# Patient Record
Sex: FEMALE | Race: WHITE | NOT HISPANIC OR LATINO | Employment: UNEMPLOYED | URBAN - METROPOLITAN AREA
[De-identification: names, ages, dates, MRNs, and addresses within clinical notes are randomized per-mention and may not be internally consistent; named-entity substitution may affect disease eponyms.]

---

## 2017-03-14 ENCOUNTER — HOSPITAL ENCOUNTER (OUTPATIENT)
Dept: RADIOLOGY | Facility: CLINIC | Age: 42
Discharge: HOME | End: 2017-03-14
Attending: INTERNAL MEDICINE
Payer: MEDICAID

## 2017-03-14 ENCOUNTER — TELEPHONE (OUTPATIENT)
Dept: ENDOCRINOLOGY | Facility: CLINIC | Age: 42
End: 2017-03-14

## 2017-03-14 ENCOUNTER — PATIENT MESSAGE (OUTPATIENT)
Dept: ENDOCRINOLOGY | Facility: CLINIC | Age: 42
End: 2017-03-14

## 2017-03-14 DIAGNOSIS — E04.1 THYROID NODULE: Primary | ICD-10-CM

## 2017-03-14 DIAGNOSIS — C73 THYROID CANCER: ICD-10-CM

## 2017-03-14 PROCEDURE — 76536 US EXAM OF HEAD AND NECK: CPT | Mod: 26,,, | Performed by: RADIOLOGY

## 2017-03-14 PROCEDURE — 76536 US EXAM OF HEAD AND NECK: CPT | Mod: TC,PO

## 2017-03-14 NOTE — TELEPHONE ENCOUNTER
The following lab tests are abnormal:  Please see the thyroid ultrasound report.  Because of previous FNA done elsewhere and [facility] report suggesting suspicious for thyroid cancer  1 Still recommend surgery  2 If not then repeat FNA here and also get genetic studies.  I reviewed case with Dr. Johnson  I am retiring and I am going to have you scheduled to see Dr. Johnson for additional opinion and f/u. In one month  Suggest we get FNA of small right thyroid nodule as well and will schedule

## 2017-03-14 NOTE — TELEPHONE ENCOUNTER
Called and notified patient of results and suggestions and she says will f/u  Suggesting seeing Dr. Johnson since soon retiring

## 2017-03-14 NOTE — TELEPHONE ENCOUNTER
"Called pt to let her know I scheduled her US at [facility] for today 3/14 as requested. It is for 330pm. I could not leave a message as her phone is "not set up to receive messages" per answering system. DH  "

## 2017-03-15 ENCOUNTER — PATIENT MESSAGE (OUTPATIENT)
Dept: ENDOCRINOLOGY | Facility: CLINIC | Age: 42
End: 2017-03-15

## 2019-08-14 ENCOUNTER — TELEPHONE (OUTPATIENT)
Dept: HEMATOLOGY/ONCOLOGY | Facility: CLINIC | Age: 44
End: 2019-08-14

## 2022-06-19 ENCOUNTER — NURSE TRIAGE (OUTPATIENT)
Dept: ADMINISTRATIVE | Facility: CLINIC | Age: 47
End: 2022-06-19
Payer: MEDICAID

## 2022-06-19 ENCOUNTER — HOSPITAL ENCOUNTER (EMERGENCY)
Facility: HOSPITAL | Age: 47
Discharge: HOME | End: 2022-06-19
Attending: EMERGENCY MEDICINE
Payer: MEDICAID

## 2022-06-19 VITALS
WEIGHT: 250 LBS | SYSTOLIC BLOOD PRESSURE: 140 MMHG | TEMPERATURE: 99 F | RESPIRATION RATE: 20 BRPM | HEIGHT: 64 IN | HEART RATE: 103 BPM | DIASTOLIC BLOOD PRESSURE: 87 MMHG | OXYGEN SATURATION: 97 % | BODY MASS INDEX: 42.68 KG/M2

## 2022-06-19 DIAGNOSIS — R00.0 TACHYCARDIA: ICD-10-CM

## 2022-06-19 DIAGNOSIS — Z20.822 EXPOSURE TO COVID-19 VIRUS: ICD-10-CM

## 2022-06-19 DIAGNOSIS — U07.1 COVID-19: Primary | ICD-10-CM

## 2022-06-19 LAB — SARS-COV-2 RDRP RESP QL NAA+PROBE: POSITIVE

## 2022-06-19 PROCEDURE — 93010 ELECTROCARDIOGRAM REPORT: CPT | Mod: ,,, | Performed by: INTERNAL MEDICINE

## 2022-06-19 PROCEDURE — 93010 EKG 12-LEAD: ICD-10-PCS | Mod: ,,, | Performed by: INTERNAL MEDICINE

## 2022-06-19 PROCEDURE — 99284 EMERGENCY DEPT VISIT MOD MDM: CPT

## 2022-06-19 PROCEDURE — 93005 ELECTROCARDIOGRAM TRACING: CPT

## 2022-06-19 PROCEDURE — U0002 COVID-19 LAB TEST NON-CDC: HCPCS | Performed by: NURSE PRACTITIONER

## 2022-06-19 RX ORDER — IBUPROFEN 100 MG/5ML
1000 SUSPENSION, ORAL (FINAL DOSE FORM) ORAL 2 TIMES DAILY
Qty: 20 TABLET | Refills: 0 | Status: SHIPPED | OUTPATIENT
Start: 2022-06-19 | End: 2022-06-19 | Stop reason: SDUPTHER

## 2022-06-19 RX ORDER — IBUPROFEN 100 MG/5ML
1000 SUSPENSION, ORAL (FINAL DOSE FORM) ORAL 2 TIMES DAILY
Qty: 20 TABLET | Refills: 0 | Status: SHIPPED | OUTPATIENT
Start: 2022-06-19 | End: 2022-06-29

## 2022-06-19 RX ORDER — PROPRANOLOL HYDROCHLORIDE 10 MG/1
10 TABLET ORAL 3 TIMES DAILY
Qty: 30 TABLET | Refills: 0 | Status: SHIPPED | OUTPATIENT
Start: 2022-06-19 | End: 2022-07-11

## 2022-06-19 RX ORDER — PROPRANOLOL HYDROCHLORIDE 10 MG/1
10 TABLET ORAL 3 TIMES DAILY
Qty: 30 TABLET | Refills: 0 | Status: SHIPPED | OUTPATIENT
Start: 2022-06-19 | End: 2022-06-19 | Stop reason: SDUPTHER

## 2022-06-19 NOTE — ED TRIAGE NOTES
Pt states that she began having a headache and feeling tired with body aches that started today pt states that she has a sore throat also pt states that her boyfriend tested positive for covid today so pt states that she believes she has covid

## 2022-06-19 NOTE — TELEPHONE ENCOUNTER
Two family members tested positive today, pt also developed sore throat, fatigue, chills, bad HA, and nausea.     PMH of T2DM and unvaccinated against COVID. Denies CP or SOB. Does not have PCP. Advised ED/UCC or OAC. VU     Reason for Disposition   HIGH RISK for severe COVID complications (e.g., weak immune system, age > 64 years, obesity with BMI > 25, pregnant, chronic lung disease or other chronic medical condition)  (Exception: Already seen by PCP and no new or worsening symptoms.)    Additional Information   Negative: SEVERE difficulty breathing (e.g., struggling for each breath, speaks in single words)   Negative: Difficult to awaken or acting confused (e.g., disoriented, slurred speech)   Negative: Bluish (or gray) lips or face now   Negative: Shock suspected (e.g., cold/pale/clammy skin, too weak to stand, low BP, rapid pulse)   Negative: Sounds like a life-threatening emergency to the triager   Negative: SEVERE or constant chest pain or pressure  (Exception: Mild central chest pain, present only when coughing.)   Negative: MODERATE difficulty breathing (e.g., speaks in phrases, SOB even at rest, pulse 100-120)   Negative: [1] Headache AND [2] stiff neck (can't touch chin to chest)   Negative: Oxygen level (e.g., pulse oximetry) 90 percent or lower   Negative: Chest pain or pressure   Negative: Patient sounds very sick or weak to the triager   Negative: MILD difficulty breathing (e.g., minimal/no SOB at rest, SOB with walking, pulse <100)   Negative: Fever > 103 F (39.4 C)   Negative: [1] Fever > 101 F (38.3 C) AND [2] age > 60 years   Negative: [1] Fever > 100.0 F (37.8 C) AND [2] bedridden (e.g., nursing home patient, CVA, chronic illness, recovering from surgery)    Protocols used: CORONAVIRUS (COVID-19) DIAGNOSED OR [id number]

## 2022-06-20 ENCOUNTER — TELEPHONE (OUTPATIENT)
Dept: CARDIOLOGY | Facility: CLINIC | Age: 47
End: 2022-06-20
Payer: MEDICAID

## 2022-06-20 NOTE — TELEPHONE ENCOUNTER
----- Message from Nicci Catherine sent at 6/20/2022  7:58 AM CDT -----  Contact: pt  Type: Needs Medical Advice    Who: Called: Pt   Best Call Back Number: [phone]  Inquiry/Question: Pt used to be a pt of Dr hdz for heart issues and diabetes and she was diagnosed with covid she states they gave her Paxlovid and 1000 units of Vitamin C in morning and evening D3 and zinc and she is asking if she can take this all with her heart and diabetes , she states if she needs to do a virtual she can please call to advise    Thank you~        Spoke with patient advised that not enough information to advise rather to take it or not but the vitamins are fine . Advised patient stay hydrated and if you decide to take medication just monitor and if any side effects happened discontinue medication and contact provider that prescribed medication for covid

## 2022-06-20 NOTE — TELEPHONE ENCOUNTER
----- Message from Nicci Catherine sent at 6/20/2022  7:58 AM CDT -----  Contact: pt  Type: Needs Medical Advice    Who: Called: Pt   Best Call Back Number: [phone]  Inquiry/Question: Pt used to be a pt of Dr hdz for heart issues and diabetes and she was diagnosed with covid she states they gave her Paxlovid and 1000 units of Vitamin C in morning and evening D3 and zinc and she is asking if she can take this all with her heart and diabetes , she states if she needs to do a virtual she can please call to advise    Thank you~

## 2022-06-20 NOTE — ED PROVIDER NOTES
Encounter Date: 2022       History     Chief Complaint   Patient presents with    COVID-19 Concerns     46 years old patient presented to ER with a complaint of exposure to covid 19 ( tested positive today), mild symptoms of weakness, body ache, history of DM, tachycardia, does not take any mediation for pt's chronic condition    The history is provided by the patient.   URI  The primary symptoms include fatigue, headaches and arthralgias. Primary symptoms do not include fever. The current episode started today. This is a new problem.   The fatigue began today.   The headache began today. The pain from the headache is at a severity of 5/10. The headache is associated with activity. The headache is not associated with aura.   Arthralgias began today. They are aching in nature. The arthralgia pain is at a severity of 5/10.   The onset of the illness is associated with exposure to sick contacts (covid 19). Risk factors for severe complications from URI include diabetes mellitus.     Review of patient's allergies indicates:   Allergen Reactions    Keflex [cephalexin] Shortness Of Breath    Pcn [penicillins] Anaphylaxis    Zithromax [azithromycin] Shortness Of Breath     Past Medical History:   Diagnosis Date    Anxiety     Aortic valve sclerosis     Diabetes mellitus, type 2     Gall stones     Tachycardia     Thyroid cancer      Past Surgical History:   Procedure Laterality Date     SECTION      4 c-sections    TUBAL LIGATION       Family History   Problem Relation Age of Onset    Coronary artery disease Mother     Heart disease Mother     Hypertension Mother     Thyroid nodules Mother     Cancer Father     Coronary artery disease Sister     Heart disease Sister     Glaucoma Maternal Grandmother      Social History     Tobacco Use    Smoking status: Never Smoker   Substance Use Topics    Alcohol use: No     Review of Systems   Constitutional: Positive for fatigue. Negative  for fever.   Musculoskeletal: Positive for arthralgias.   Neurological: Positive for headaches.   All other systems reviewed and are negative.      Physical Exam     Initial Vitals [06/19/22 1810]   BP Pulse Resp Temp SpO2   (!) 156/96 (!) 121 18 98.6 °F (37 °C) 95 %      MAP       --         Physical Exam    Nursing note and vitals reviewed.  Constitutional: She appears well-developed and well-nourished. She is Obese . No distress.   HENT:   Head: Normocephalic and atraumatic.   Eyes: EOM are normal. Pupils are equal, round, and reactive to light.   Neck:   Normal range of motion.  Cardiovascular: Regular rhythm. Tachycardia present.    No murmur heard.  Pulmonary/Chest: Breath sounds normal. No respiratory distress. She has no wheezes. She has no rhonchi. She has no rales. She exhibits no tenderness.   Abdominal: Abdomen is soft.   Musculoskeletal:         General: Normal range of motion.      Cervical back: Normal range of motion.     Neurological: She is alert and oriented to person, place, and time. She has normal strength. GCS score is 15. GCS eye subscore is 4. GCS verbal subscore is 5. GCS motor subscore is 6.   Skin: Skin is warm and dry.   Psychiatric: She has a normal mood and affect.         ED Course   Procedures  Labs Reviewed   SARS-COV-2 RNA AMPLIFICATION, QUAL - Abnormal; Notable for the following components:       Result Value    SARS-CoV-2 RNA, Amplification, Qual Positive (*)     All other components within normal limits    Narrative:     Is the patient symptomatic?->Yes     EKG Readings: (Independently Interpreted)   Initial Reading: No STEMI. Rhythm: Sinus Tachycardia. Heart Rate: 103. Ectopy: No Ectopy.     ECG Results          EKG 12-lead (Final result)  Result time 06/20/22 09:11:33    Final result by Interface, Lab In [facility] (06/20/22 09:11:33)                 Narrative:    Test Reason : R00.0,    Vent. Rate : 101 BPM     Atrial Rate : 101 BPM     P-R Int : 166 ms          QRS Dur : 084  ms      QT Int : 360 ms       P-R-T Axes : 056 001 028 degrees     QTc Int : 466 ms    Sinus tachycardia  Possible Inferior infarct ,age undetermined  Abnormal ECG  No previous ECGs available  Confirmed by Zia Reese MD ([id number]) on 6/20/2022 9:11:24 AM    Referred By: TRISTAN   SELF           Confirmed By:Zia Reese MD                            Imaging Results    None          Medications - No data to display  Medical Decision Making:   Differential Diagnosis:   Exposure to covid 19, tachycardia   ED Management:  Confirmed covid 19  Based on chronic condition of type 2 DM, paxlovid was prescribed.   Patient was advised to see her PCP for management of hypertension, tachycardia, DM  Please return for new, changing, or worsening pain. The patient was also instructed that follow up with a primary care physician is strongly recommended after any visit to the ED.  Patient expressed understanding and agreed with treatment plan and was discharged in stable condition.                           Clinical Impression:   Final diagnoses:  [U07.1] COVID-19 (Primary)  [Z20.822] Exposure to COVID-19 virus  [R00.0] Tachycardia          ED Disposition Condition    Discharge Stable        ED Prescriptions     Medication Sig Dispense Start Date End Date Auth. Provider    propranoloL (INDERAL) 10 MG tablet  (Status: Discontinued) Take 1 tablet (10 mg total) by mouth 3 (three) times daily. for 10 days 30 tablet 6/19/2022 6/19/2022 Kieran Norton NP    nirmatrelvir-ritonavir 150 mg x 2- 100 mg copackaged tablets (EUA)  (Status: Discontinued) Take 3 tablets by mouth 2 (two) times daily for 5 days. Each dose contains 2 nirmatrelvir (pink tablets) and 1 ritonavir (white tablet). Take all 3 tablets together 30 tablet 6/19/2022 6/19/2022 Kieran Norton NP    ascorbic acid, vitamin C, (VITAMIN C) 1000 MG tablet  (Status: Discontinued) Take 1 tablet (1,000 mg total) by mouth 2 (two) times daily. for 10 days 20 tablet 6/19/2022 6/19/2022 Kieran Norton  NP    zinc acetate 25 mg (zinc) Cap  (Status: Discontinued) Take 25 mg by mouth once daily at 6am. for 10 days 30 each 6/19/2022 6/19/2022 Kieran Norton NP    ascorbic acid, vitamin C, (VITAMIN C) 1000 MG tablet Take 1 tablet (1,000 mg total) by mouth 2 (two) times daily. for 10 days 20 tablet 6/19/2022 6/29/2022 Kieran Norton NP    nirmatrelvir-ritonavir 150 mg x 2- 100 mg copackaged tablets (EUA) Take 3 tablets by mouth 2 (two) times daily for 5 days. Each dose contains 2 nirmatrelvir (pink tablets) and 1 ritonavir (white tablet). Take all 3 tablets together 30 tablet 6/19/2022 6/24/2022 Kieran Norton NP    propranoloL (INDERAL) 10 MG tablet Take 1 tablet (10 mg total) by mouth 3 (three) times daily. for 10 days 30 tablet 6/19/2022 6/29/2022 Kieran Norton NP    zinc acetate 25 mg (zinc) Cap Take 25 mg by mouth once daily at 6am. for 10 days 30 each 6/19/2022 6/29/2022 Kieran Norton NP        Follow-up Information     Follow up With Specialties Details Why Contact Info    PCP        [facility] Emergency Medicine  If symptoms worsen [address] [facility] [address] [phone]           Kieran Norton NP  06/20/22 [id number]

## 2022-06-22 ENCOUNTER — OFFICE VISIT (OUTPATIENT)
Dept: FAMILY MEDICINE | Facility: CLINIC | Age: 47
End: 2022-06-22
Payer: MEDICAID

## 2022-06-22 ENCOUNTER — PATIENT MESSAGE (OUTPATIENT)
Dept: FAMILY MEDICINE | Facility: CLINIC | Age: 47
End: 2022-06-22

## 2022-06-22 DIAGNOSIS — F41.9 ANXIETY: Primary | ICD-10-CM

## 2022-06-22 DIAGNOSIS — E11.9 CONTROLLED TYPE 2 DIABETES MELLITUS WITHOUT COMPLICATION, WITHOUT LONG-TERM CURRENT USE OF INSULIN: ICD-10-CM

## 2022-06-22 DIAGNOSIS — C73 THYROID CANCER: ICD-10-CM

## 2022-06-22 DIAGNOSIS — U07.1 COVID: ICD-10-CM

## 2022-06-22 PROCEDURE — 99213 PR OFFICE/OUTPT VISIT, EST, LEVL III, 20-29 MIN: ICD-10-PCS | Mod: GT,,, | Performed by: FAMILY MEDICINE

## 2022-06-22 PROCEDURE — 99213 OFFICE O/P EST LOW 20 MIN: CPT | Mod: GT,,, | Performed by: FAMILY MEDICINE

## 2022-06-22 RX ORDER — METFORMIN HYDROCHLORIDE 500 MG/1
500 TABLET ORAL
Qty: 90 TABLET | Refills: 3 | Status: SHIPPED | OUTPATIENT
Start: 2022-06-22 | End: 2023-06-22

## 2022-06-22 NOTE — PROGRESS NOTES
Chief Complaint   Patient presents with    COVID-19 Concerns        HPI:  telemed visit - recently diagnosed with COVID.  In setting of type 2 DM, papillary thyroid cancer - gets yearly monitoring US    Diagnosed with COVID on 4 days ago.    Sugars have been slightly elevated     Was given paxlovid at discharge    ROS: cough, fatigue, chills      General:  AOx3, well nourished and developed in no acute distress  Eyes:  PERRLA, EOMI, vision intact grossly  Neck:  trachea midline with no masses or thyromegaly  Musculoskeletal:  Normal posture.  Normal muscular development.  Neurological:  CN II-XII grossly intact based off of video interaction  Psych:  Normal mood and affect.  Able to demonstrate good judgement and personal insight.      Assessment/Plan:    Anxiety    COVID    Thyroid cancer    Controlled type 2 diabetes mellitus without complication, without long-term current use of insulin     ok to resume paxlovid  Will start low dose methotrexate    Advised turmeric and B12 supplementation      The patient location is: home  The chief complaint leading to consultation is: covid and diabetes    Visit type: audiovisual    Face to Face time with patient: 15  15 minutes of total time spent on the encounter, which includes face to face time and non-face to face time preparing to see the patient (eg, review of tests), Obtaining and/or reviewing separately obtained history, Documenting clinical information in the electronic or other health record, Independently interpreting results (not separately reported) and communicating results to the patient/family/caregiver, or Care coordination (not separately reported).         Each patient to whom he or she provides medical services by telemedicine is:  (1) informed of the relationship between the physician and patient and the respective role of any other health care provider with respect to management of the patient; and (2) notified that he or she may decline to receive  medical services by telemedicine and may withdraw from such care at any time.    Notes:

## 2022-06-23 ENCOUNTER — PATIENT MESSAGE (OUTPATIENT)
Dept: FAMILY MEDICINE | Facility: CLINIC | Age: 47
End: 2022-06-23
Payer: MEDICAID

## 2022-06-28 ENCOUNTER — OFFICE VISIT (OUTPATIENT)
Dept: FAMILY MEDICINE | Facility: CLINIC | Age: 47
End: 2022-06-28
Payer: MEDICAID

## 2022-06-28 DIAGNOSIS — E11.9 CONTROLLED TYPE 2 DIABETES MELLITUS WITHOUT COMPLICATION, WITHOUT LONG-TERM CURRENT USE OF INSULIN: Primary | ICD-10-CM

## 2022-06-28 DIAGNOSIS — F41.9 ANXIETY: ICD-10-CM

## 2022-06-28 DIAGNOSIS — Z86.16 HISTORY OF COVID-19: ICD-10-CM

## 2022-06-28 PROCEDURE — 1159F PR MEDICATION LIST DOCUMENTED IN MEDICAL RECORD: ICD-10-PCS | Mod: CPTII,GT,, | Performed by: FAMILY MEDICINE

## 2022-06-28 PROCEDURE — 1160F PR REVIEW ALL MEDS BY PRESCRIBER/CLIN PHARMACIST DOCUMENTED: ICD-10-PCS | Mod: CPTII,GT,, | Performed by: FAMILY MEDICINE

## 2022-06-28 PROCEDURE — 1159F MED LIST DOCD IN RCRD: CPT | Mod: CPTII,GT,, | Performed by: FAMILY MEDICINE

## 2022-06-28 PROCEDURE — 1160F RVW MEDS BY RX/DR IN RCRD: CPT | Mod: CPTII,GT,, | Performed by: FAMILY MEDICINE

## 2022-06-28 PROCEDURE — 99214 PR OFFICE/OUTPT VISIT, EST, LEVL IV, 30-39 MIN: ICD-10-PCS | Mod: GT,,, | Performed by: FAMILY MEDICINE

## 2022-06-28 PROCEDURE — 99214 OFFICE O/P EST MOD 30 MIN: CPT | Mod: GT,,, | Performed by: FAMILY MEDICINE

## 2022-06-28 NOTE — PROGRESS NOTES
The patient location is: [place], [place]  The chief complaint leading to consultation is: dx of COVID 6/19, also hx of DM2    Visit type: audiovisual    Face to Face time with patient: 22 minutes with 30 minutes of total time spent on the encounter, which includes face to face time and non-face to face time preparing to see the patient (eg, review of tests), Obtaining and/or reviewing separately obtained history, Documenting clinical information in the electronic or other health record, Independently interpreting results (not separately reported) and communicating results to the patient/family/caregiver, or Care coordination (not separately reported).         Each patient to whom he or she provides medical services by telemedicine is:  (1) informed of the relationship between the physician and patient and the respective role of any other health care provider with respect to management of the patient; and (2) notified that he or she may decline to receive medical services by telemedicine and may withdraw from such care at any time.    Notes:     Subjective:       Patient ID: Charity Ames is a 46 y.o. female.    Chief Complaint: Diabetes and COVID-19 Concerns    VV today to address COVID concerns. Dx with COVID 6/19 (10 days ago), still feeling weak and tired, has dull HA, just does not feel well. Took Paxlovid, Zinc, and vitamin C supplements. Admits to occas dry cough but no chest tightness or SOB, no prod cough. No fever, chills, or sweats.    States blood sugars have been fluctuating some throughout her diagnosis of COVID. Currently only taking metformin.      Review of Systems   Constitutional: Negative for activity change and unexpected weight change.   HENT: Negative for hearing loss, rhinorrhea and trouble swallowing.    Eyes: Negative for discharge and visual disturbance.   Respiratory: Negative for chest tightness and wheezing.    Cardiovascular: Negative for chest pain and palpitations.   Gastrointestinal:  Negative for blood in stool, constipation, diarrhea and vomiting.   Endocrine: Negative for polydipsia and polyuria.   Genitourinary: Negative for difficulty urinating, dysuria, hematuria and menstrual problem.   Musculoskeletal: Negative for arthralgias, joint swelling and neck pain.   Neurological: Positive for weakness and headaches.   Psychiatric/Behavioral: Negative for confusion and dysphoric mood.   All other systems reviewed and are negative.        Past Medical History:   Diagnosis Date    Anxiety     Aortic valve sclerosis     Diabetes mellitus, type 2     Gall stones     Tachycardia     Thyroid cancer      Past Surgical History:   Procedure Laterality Date     SECTION      4 c-sections    TUBAL LIGATION       Family History   Problem Relation Age of Onset    Coronary artery disease Mother     Heart disease Mother     Hypertension Mother     Thyroid nodules Mother     Cancer Father     Coronary artery disease Sister     Heart disease Sister     Glaucoma Maternal Grandmother        Review of patient's allergies indicates:   Allergen Reactions    Keflex [cephalexin] Shortness Of Breath    Pcn [penicillins] Anaphylaxis    Zithromax [azithromycin] Shortness Of Breath       Current Outpatient Medications:     metFORMIN (GLUCOPHAGE) 500 MG tablet, Take 1 tablet (500 mg total) by mouth daily with breakfast., Disp: 90 tablet, Rfl: 3    propranoloL (INDERAL) 10 MG tablet, Take 1 tablet (10 mg total) by mouth 3 (three) times daily. for 10 days, Disp: 30 tablet, Rfl: 0      Objective:      There were no vitals taken for this visit.  Physical Exam  Constitutional:       General: She is not in acute distress.  Pulmonary:      Effort: Pulmonary effort is normal. No respiratory distress.   Neurological:      Mental Status: She is alert and oriented to person, place, and time.   Psychiatric:         Mood and Affect: Mood normal.         Behavior: Behavior normal.         Thought Content:  Thought content normal.         Judgment: Judgment normal.         Assessment:       1. Controlled type 2 diabetes mellitus without complication, without long-term current use of insulin    2. Anxiety    3. History of COVID-19        Plan:       Controlled type 2 diabetes mellitus without complication, without long-term current use of insulin    Anxiety    History of COVID-19    Recommend she continue supportive tx for fatigue and HA, be sure to stay hydrated (for recovery from COVID-19 as well as to help with glucose mgmt.) At this point she is not considered contagious. Could continue to test positive for several more weeks.    Okay to take Zinc and vitamin C though not necessary--and max vitamin C daily average should be no more than 500mg.    See below:    Breathing exercises for COVID:  Phase 1: Deep Breathing While On Your Back  Lie on your back and bend your knees so that the bottom of your feet are resting on the bed.  Place your hands on top of your stomach or wrap them around the sides of your stomach.  Close your lips and place your tongue on the roof of your mouth.  Breathe in through the nose and pull air down into your stomach where your hands are. Try to spread your fingers apart with your breath.  Slowly exhale your breath through the nose.  Repeat deep breaths for one minute.    Phase 2: Deep Breathing While on Your Stomach  Lie on your stomach and rest your head on your hands to allow room to breathe.  Close your lips and place your tongue on the roof of your mouth.  Breathe in through your nose and pull air down into your stomach. Try to focus on your stomach pushing into the mattress as you breathe.  Slowly exhale your breath through your nose.  Repeat deep breaths for one minute.    Phase 3: Deep Breathing While Sitting  Sit upright on the edge of a bed or in a sturdy chair.  Place your hands around the sides of your stomach.  Close lips and place your tongue on the roof of your mouth.  Breathe  in through your nose and pull air down into your stomach where your hands are. Try to spread your fingers apart with your breath.  Slowly exhale your breath through your nose.  Repeat deep breaths for one minute.    Phase 4: Deep Breathing While Standing  Stand upright and place your hands around the sides of your stomach.  Close your lips and place your tongue on the roof of your mouth.  Breathe in through your nose and pull air down into your stomach where your hands are. Try to spread your fingers apart with your breath.  Slowly exhale your breath* through your nose.  Repeat deep breaths for one minute.  *You may practice humming exhalation here if desired.    Follow up to estab with PCP of your choice.        Previous records in Epic were reviewed, including the last 3 months of encounters, imaging, laboratory, and pathology reports.    Strict return precautions reviewed and patient verbalized understanding. Risks, benefits, and alternatives to the plan were reviewed in detail and all questions answered to the patient's satisfaction. Patient agrees to return to clinic or ER if symptoms worsen. 30 minutes total were spent on today's visit, not limited to but including time based on counseling and coordination of care.    Patient instructed that best way to communicate with my office staff is for patient to get on the [facility] epic patient portal to expedite communication and communication issues that may occur.  Patient was given instructions on how to get on the portal.  I encouraged patient to obtain portal access as well.  Ultimately it is up to the patient to obtain access.  Patient voiced understanding.    This note was created using [employer] voice recognition software that occasionally may misinterpret phrases or words.    Follow up if symptoms worsen or fail to improve.

## 2022-07-05 ENCOUNTER — PATIENT MESSAGE (OUTPATIENT)
Dept: FAMILY MEDICINE | Facility: CLINIC | Age: 47
End: 2022-07-05
Payer: MEDICAID

## 2022-07-31 ENCOUNTER — PATIENT MESSAGE (OUTPATIENT)
Dept: FAMILY MEDICINE | Facility: CLINIC | Age: 47
End: 2022-07-31
Payer: MEDICAID

## 2023-06-21 ENCOUNTER — PATIENT MESSAGE (OUTPATIENT)
Dept: FAMILY MEDICINE | Facility: CLINIC | Age: 48
End: 2023-06-21
Payer: MEDICAID

## 2023-06-22 ENCOUNTER — PATIENT MESSAGE (OUTPATIENT)
Dept: FAMILY MEDICINE | Facility: CLINIC | Age: 48
End: 2023-06-22
Payer: MEDICAID

## 2023-06-22 RX ORDER — DOXYCYCLINE HYCLATE 100 MG
100 TABLET ORAL 2 TIMES DAILY
Qty: 14 TABLET | Refills: 0 | Status: SHIPPED | OUTPATIENT
Start: 2023-06-22 | End: 2023-06-29